# Patient Record
Sex: MALE | Race: WHITE | URBAN - METROPOLITAN AREA
[De-identification: names, ages, dates, MRNs, and addresses within clinical notes are randomized per-mention and may not be internally consistent; named-entity substitution may affect disease eponyms.]

---

## 2023-07-22 ENCOUNTER — APPOINTMENT (OUTPATIENT)
Dept: GENERAL RADIOLOGY | Age: 48
End: 2023-07-22
Payer: COMMERCIAL

## 2023-07-22 ENCOUNTER — HOSPITAL ENCOUNTER (EMERGENCY)
Age: 48
Discharge: HOME | End: 2023-07-22
Attending: EMERGENCY MEDICINE
Payer: COMMERCIAL

## 2023-07-22 VITALS
RESPIRATION RATE: 12 BRPM | DIASTOLIC BLOOD PRESSURE: 90 MMHG | SYSTOLIC BLOOD PRESSURE: 144 MMHG | OXYGEN SATURATION: 99 % | TEMPERATURE: 98.5 F | HEIGHT: 76 IN | WEIGHT: 211.9 LBS | BODY MASS INDEX: 25.8 KG/M2 | HEART RATE: 82 BPM

## 2023-07-22 DIAGNOSIS — R09.81 NASAL CONGESTION: ICD-10-CM

## 2023-07-22 DIAGNOSIS — J02.9 ACUTE PHARYNGITIS, UNSPECIFIED ETIOLOGY: Primary | ICD-10-CM

## 2023-07-22 LAB
S PYO AG THROAT QL: POSITIVE
SARS-COV-2 RDRP RESP QL NAA+PROBE: NOT DETECTED

## 2023-07-22 PROCEDURE — 6360000002 HC RX W HCPCS: Performed by: EMERGENCY MEDICINE

## 2023-07-22 PROCEDURE — 87880 STREP A ASSAY W/OPTIC: CPT

## 2023-07-22 PROCEDURE — 6370000000 HC RX 637 (ALT 250 FOR IP): Performed by: EMERGENCY MEDICINE

## 2023-07-22 PROCEDURE — 71046 X-RAY EXAM CHEST 2 VIEWS: CPT

## 2023-07-22 PROCEDURE — 99284 EMERGENCY DEPT VISIT MOD MDM: CPT

## 2023-07-22 PROCEDURE — 87635 SARS-COV-2 COVID-19 AMP PRB: CPT

## 2023-07-22 RX ORDER — AMOXICILLIN AND CLAVULANATE POTASSIUM 875; 125 MG/1; MG/1
1 TABLET, FILM COATED ORAL ONCE
Status: COMPLETED | OUTPATIENT
Start: 2023-07-22 | End: 2023-07-22

## 2023-07-22 RX ORDER — OXYMETAZOLINE HYDROCHLORIDE 0.05 G/100ML
2 SPRAY NASAL ONCE
Status: DISCONTINUED | OUTPATIENT
Start: 2023-07-22 | End: 2023-07-22 | Stop reason: HOSPADM

## 2023-07-22 RX ORDER — AMOXICILLIN AND CLAVULANATE POTASSIUM 875; 125 MG/1; MG/1
1 TABLET, FILM COATED ORAL 2 TIMES DAILY
Qty: 14 TABLET | Refills: 0 | Status: SHIPPED | OUTPATIENT
Start: 2023-07-22 | End: 2023-07-29

## 2023-07-22 RX ORDER — DEXAMETHASONE 4 MG/1
8 TABLET ORAL ONCE
Status: COMPLETED | OUTPATIENT
Start: 2023-07-22 | End: 2023-07-22

## 2023-07-22 RX ADMIN — DEXAMETHASONE 8 MG: 4 TABLET ORAL at 01:25

## 2023-07-22 RX ADMIN — AMOXICILLIN AND CLAVULANATE POTASSIUM 1 TABLET: 875; 125 TABLET ORAL at 01:25

## 2023-07-22 ASSESSMENT — PAIN - FUNCTIONAL ASSESSMENT: PAIN_FUNCTIONAL_ASSESSMENT: 0-10

## 2023-07-22 ASSESSMENT — PAIN DESCRIPTION - LOCATION: LOCATION: THROAT

## 2023-07-22 ASSESSMENT — PAIN SCALES - GENERAL: PAINLEVEL_OUTOF10: 3

## 2023-07-22 NOTE — ED PROVIDER NOTES
[address] ENCOUNTER        Patient Name: Radha Henderson  MRN: [id number]  [address] 1975  Date of evaluation: 7/22/2023  Provider: Lennox Friedman, MD  PCP: Matthew YANES  Note Started: 1:17 AM EDT 7/22/23    CHIEF COMPLAINT       Sore Throat (States that he has had a sore throat for a few days, and came in just to get a dose of abx/)      HISTORY OF PRESENT ILLNESS: 1 or more Elements     History from : Patient    Limitations to history : None    Radha Henderson is a 57 y.o. male who presents for evaluation of sore throat. He states that he has had a sore throat for the past several days. He states that when he tried to go to sleep tonight, he had difficulty breathing when he is lying flat. He thinks it is because of swelling in his throat and nasal congestion. He denies any chest pain. No fevers. No known sick contacts. He states that he has to get to work at 5:30 in the morning. He denies any new swelling in the legs. No prior cardiac disease. Nursing Notes were all reviewed and agreed with or any disagreements were addressed in the HPI. REVIEW OF SYSTEMS :      Review of Systems    Positives and Pertinent negatives as per HPI. SURGICAL HISTORY     Past Surgical History:   Procedure Laterality Date    KNEE SURGERY Left        CURRENTMEDICATIONS       Previous Medications    ALBUTEROL SULFATE HFA (PROVENTIL HFA) 108 (90 BASE) MCG/ACT INHALER    Inhale 2 puffs into the lungs every 4 hours as needed for Wheezing or Shortness of Breath (Space out to every 6 hours as symptoms improve) Space out to every 6 hours as symptoms improve. HYDROCODONE-ACETAMINOPHEN (NORCO)  MG PER TABLET    Take 1 tablet by mouth every 8 hours as needed for Pain    HYDROCODONE-ACETAMINOPHEN (NORCO) 5-325 MG PER TABLET    1 To 2 tablets every 6 hours as needed p.o. pain, rib fractures.     IBUPROFEN (ADVIL;MOTRIN) 800 MG TABLET    Take 1 tablet by

## 2023-07-22 NOTE — ED NOTES
Discharge instructions reviewed with pt and pt denied having any questions. Discharge paperwork signed and pt discharged.         Tarik Sena RN  07/22/23 [id number]

## 2023-07-22 NOTE — DISCHARGE INSTRUCTIONS
As discussed, you are put on antibiotics to cover for potential strep infection of your throat. Please follow-up with your primary care doctor.   Return for worsening symptoms